# Patient Record
Sex: FEMALE | Race: WHITE | NOT HISPANIC OR LATINO | URBAN - METROPOLITAN AREA
[De-identification: names, ages, dates, MRNs, and addresses within clinical notes are randomized per-mention and may not be internally consistent; named-entity substitution may affect disease eponyms.]

---

## 2017-09-14 ENCOUNTER — HOSPITAL ENCOUNTER (OUTPATIENT)
Facility: MEDICAL CENTER | Age: 32
End: 2017-09-14
Attending: OBSTETRICS & GYNECOLOGY | Admitting: OBSTETRICS & GYNECOLOGY

## 2017-09-14 VITALS — DIASTOLIC BLOOD PRESSURE: 79 MMHG | HEART RATE: 105 BPM | SYSTOLIC BLOOD PRESSURE: 130 MMHG

## 2017-09-14 LAB
ABO GROUP BLD: NORMAL
AMPHET UR QL SCN: NEGATIVE
BARBITURATES UR QL SCN: NEGATIVE
BASOPHILS # BLD AUTO: 0.5 % (ref 0–1.8)
BASOPHILS # BLD: 0.06 K/UL (ref 0–0.12)
BENZODIAZ UR QL SCN: NEGATIVE
BLD GP AB SCN SERPL QL: NORMAL
BZE UR QL SCN: NEGATIVE
CANNABINOIDS UR QL SCN: NEGATIVE
EOSINOPHIL # BLD AUTO: 0.1 K/UL (ref 0–0.51)
EOSINOPHIL NFR BLD: 0.8 % (ref 0–6.9)
ERYTHROCYTE [DISTWIDTH] IN BLOOD BY AUTOMATED COUNT: 40 FL (ref 35.9–50)
HBV SURFACE AG SER QL: NEGATIVE
HCT VFR BLD AUTO: 36 % (ref 37–47)
HCV AB SER QL: NEGATIVE
HGB BLD-MCNC: 12.6 G/DL (ref 12–16)
HIV 1+2 AB+HIV1 P24 AG SERPL QL IA: NON REACTIVE
IMM GRANULOCYTES # BLD AUTO: 0.14 K/UL (ref 0–0.11)
IMM GRANULOCYTES NFR BLD AUTO: 1.1 % (ref 0–0.9)
LYMPHOCYTES # BLD AUTO: 2.21 K/UL (ref 1–4.8)
LYMPHOCYTES NFR BLD: 16.8 % (ref 22–41)
MCH RBC QN AUTO: 31.7 PG (ref 27–33)
MCHC RBC AUTO-ENTMCNC: 35 G/DL (ref 33.6–35)
MCV RBC AUTO: 90.7 FL (ref 81.4–97.8)
METHADONE UR QL SCN: NEGATIVE
MONOCYTES # BLD AUTO: 0.81 K/UL (ref 0–0.85)
MONOCYTES NFR BLD AUTO: 6.2 % (ref 0–13.4)
NEUTROPHILS # BLD AUTO: 9.81 K/UL (ref 2–7.15)
NEUTROPHILS NFR BLD: 74.6 % (ref 44–72)
NRBC # BLD AUTO: 0 K/UL
NRBC BLD AUTO-RTO: 0 /100 WBC
OPIATES UR QL SCN: NEGATIVE
OXYCODONE UR QL SCN: NEGATIVE
PCP UR QL SCN: NEGATIVE
PLATELET # BLD AUTO: 172 K/UL (ref 164–446)
PMV BLD AUTO: 11.3 FL (ref 9–12.9)
PROPOXYPH UR QL SCN: NEGATIVE
RBC # BLD AUTO: 3.97 M/UL (ref 4.2–5.4)
RH BLD: NORMAL
RUBV AB SER QL: 95.9 IU/ML
TREPONEMA PALLIDUM IGG+IGM AB [PRESENCE] IN SERUM OR PLASMA BY IMMUNOASSAY: NON REACTIVE
WBC # BLD AUTO: 13.1 K/UL (ref 4.8–10.8)

## 2017-09-14 PROCEDURE — 87340 HEPATITIS B SURFACE AG IA: CPT

## 2017-09-14 PROCEDURE — 86803 HEPATITIS C AB TEST: CPT

## 2017-09-14 PROCEDURE — 85025 COMPLETE CBC W/AUTO DIFF WBC: CPT

## 2017-09-14 PROCEDURE — 80307 DRUG TEST PRSMV CHEM ANLYZR: CPT

## 2017-09-14 PROCEDURE — 59025 FETAL NON-STRESS TEST: CPT | Performed by: OBSTETRICS & GYNECOLOGY

## 2017-09-14 PROCEDURE — 87389 HIV-1 AG W/HIV-1&-2 AB AG IA: CPT

## 2017-09-14 PROCEDURE — 36415 COLL VENOUS BLD VENIPUNCTURE: CPT

## 2017-09-14 PROCEDURE — 86780 TREPONEMA PALLIDUM: CPT

## 2017-09-14 PROCEDURE — 86762 RUBELLA ANTIBODY: CPT

## 2017-09-14 PROCEDURE — 87653 STREP B DNA AMP PROBE: CPT

## 2017-09-14 NOTE — PROGRESS NOTES
Patient came in for ob check.she has had care with the pregnancy-some in [place] and some in [place].sheis due 9-22 which makes her 38.6weeks.she has had a backache and a little spotting.patient is currently homeless and is here with children and cousin.spec exam done by Dr Mcgill-cervix closed-no bleeding.prenatal labs and GBS done.1535 patient discharged in stable condition with family.

## 2017-09-14 NOTE — DISCHARGE PLANNING
:    Referral: Homeless family needing resources.    Intervention:  Received a call from RN regarding patient who is 38 weeks pregnant and traveling from [place] back to [place] and ran out of money for gas and lodging.  Mother is also having contractions.  Met with mother, her cousin, Yaya Kimbrough, and mother's 2 children: ages 9 and 2.  The cousin stated they went to [place] because they were told there would be jobs and housing and when they got there they couldn't find either.  They are on their way back to [place] where they have a place to live.      SW met with patient and cousin and provided a packet of resources for shelters, food, and general resource lists.  Family was given 4 meal vouchers and the cousin was going to take the kids to the cafeteria to get some food.  Called [facility] and was told they do not assist with gas unless they have an open case with the family.  Recommended they go to [employer] which may offer gas car assistance.  Also recommended they contact churches in the area for assistance.  KIERA called the [facility] and was told they have a 2 month long waiting list.  Discussed with family and RN.    Plan:  Resources provided to family that could offer assistance with gas cards.

## 2017-09-14 NOTE — PROGRESS NOTES
[facility] LABOR AND DELIVERY TRIAGE PROGRESS NOTE    PATIENT ID:  NAME:  Da Eid  MRN:               [id number]  YOB: 1985     32 y.o. female  who has had prenatal care in [place] and [place], and per patient, has a KISHA of 17 by a 1st trimester ultrasound making EGA 38w6d. She just recently arrived with her 2 children and cousin to [place] and they are homeless. She would like to stay in [place] however her cousin is finding it difficult to work. Per patient, she received most of her prenatal care at [facility] with Dr. Troncoso in [place], [place].    Subjective: Pt presents with constant backache and light vaginal spotting with mucous discharge.   Pregnancy complicated by homelessness.    negative  For CTXS.   positive Feels pain   negative for LOF  negative for vaginal bleeding.   positive for fetal movement    ROS: Patient denies any fever chills, nausea, vomiting, headache, chest pain, shortness of breath, or dysuria or unusual swelling of hands or feet.     Objective:    Vitals:    17 1319   BP: 130/79   Pulse: (!) 105     No data recorded.    General: No acute distress, resting comfortably in bed.  HEENT: normocephalic, nontraumatic, PERRLA, EOMI  Cardiovascular: Heart RRR with no murmurs, rubs or gallops. Distal Pulses 2+  Respiratory: symmetric chest expansion, lungs CTAB  Abdomen: gravid, nontender  Extremities: no edema    Cervix:  closed on sterile spec exam  [place]: Occasional uterine contractions   FHRM: Baseline 125, some accels, no decels, moderate variability    Assessment: 32 y.o.  female who is not in labor. Records are being obtained from clinic in [place]. Prenatal labs ordered, Utox, and CBC. Sterile spec exam showed no blood or pooling of fluid and a closed cervix. Social work was called for support services after discharge.     Plan:   1. Patient is cleared to return home with family. Encouraged to see MD for increased painful uterine contractions @ 3-5, vaginal  bleeding, loss of fluid, or other serious symptoms.  2. At discharge give community resources for homelessness    Discussed case with Dr. Ponce, C Attending.    Makenna Mcgill M.D.

## 2017-09-15 LAB — GP B STREP DNA SPEC QL NAA+PROBE: NEGATIVE

## 2017-09-19 ENCOUNTER — HOSPITAL ENCOUNTER (INPATIENT)
Facility: MEDICAL CENTER | Age: 32
LOS: 2 days | End: 2017-09-21
Attending: OBSTETRICS & GYNECOLOGY | Admitting: OBSTETRICS & GYNECOLOGY
Payer: COMMERCIAL

## 2017-09-19 LAB
ALBUMIN SERPL BCP-MCNC: 2.8 G/DL (ref 3.2–4.9)
ALBUMIN/GLOB SERPL: 0.8 G/DL
ALP SERPL-CCNC: 220 U/L (ref 30–99)
ALT SERPL-CCNC: 6 U/L (ref 2–50)
AMPHET UR QL SCN: NEGATIVE
ANION GAP SERPL CALC-SCNC: 10 MMOL/L (ref 0–11.9)
AST SERPL-CCNC: 12 U/L (ref 12–45)
BARBITURATES UR QL SCN: NEGATIVE
BASOPHILS # BLD AUTO: 0.5 % (ref 0–1.8)
BASOPHILS # BLD: 0.06 K/UL (ref 0–0.12)
BENZODIAZ UR QL SCN: NEGATIVE
BILIRUB SERPL-MCNC: 0.4 MG/DL (ref 0.1–1.5)
BUN SERPL-MCNC: 5 MG/DL (ref 8–22)
BZE UR QL SCN: NEGATIVE
CALCIUM SERPL-MCNC: 8.5 MG/DL (ref 8.5–10.5)
CANNABINOIDS UR QL SCN: NEGATIVE
CHLORIDE SERPL-SCNC: 107 MMOL/L (ref 96–112)
CO2 SERPL-SCNC: 17 MMOL/L (ref 20–33)
CREAT SERPL-MCNC: 0.46 MG/DL (ref 0.5–1.4)
EOSINOPHIL # BLD AUTO: 0.15 K/UL (ref 0–0.51)
EOSINOPHIL NFR BLD: 1.1 % (ref 0–6.9)
ERYTHROCYTE [DISTWIDTH] IN BLOOD BY AUTOMATED COUNT: 40.8 FL (ref 35.9–50)
ERYTHROCYTE [DISTWIDTH] IN BLOOD BY AUTOMATED COUNT: 40.9 FL (ref 35.9–50)
ETHANOL BLD-MCNC: 0 G/DL
GFR SERPL CREATININE-BSD FRML MDRD: >60 ML/MIN/1.73 M 2
GLOBULIN SER CALC-MCNC: 3.3 G/DL (ref 1.9–3.5)
GLUCOSE SERPL-MCNC: 128 MG/DL (ref 65–99)
HCT VFR BLD AUTO: 33.4 % (ref 37–47)
HCT VFR BLD AUTO: 36.7 % (ref 37–47)
HGB BLD-MCNC: 11.5 G/DL (ref 12–16)
HGB BLD-MCNC: 12.7 G/DL (ref 12–16)
HOLDING TUBE BB 8507: NORMAL
IMM GRANULOCYTES # BLD AUTO: 0.13 K/UL (ref 0–0.11)
IMM GRANULOCYTES NFR BLD AUTO: 1 % (ref 0–0.9)
LYMPHOCYTES # BLD AUTO: 2.44 K/UL (ref 1–4.8)
LYMPHOCYTES NFR BLD: 18.4 % (ref 22–41)
MCH RBC QN AUTO: 31.5 PG (ref 27–33)
MCH RBC QN AUTO: 31.5 PG (ref 27–33)
MCHC RBC AUTO-ENTMCNC: 34.4 G/DL (ref 33.6–35)
MCHC RBC AUTO-ENTMCNC: 34.6 G/DL (ref 33.6–35)
MCV RBC AUTO: 91.1 FL (ref 81.4–97.8)
MCV RBC AUTO: 91.5 FL (ref 81.4–97.8)
METHADONE UR QL SCN: NEGATIVE
MONOCYTES # BLD AUTO: 1 K/UL (ref 0–0.85)
MONOCYTES NFR BLD AUTO: 7.5 % (ref 0–13.4)
NEUTROPHILS # BLD AUTO: 9.5 K/UL (ref 2–7.15)
NEUTROPHILS NFR BLD: 71.5 % (ref 44–72)
NRBC # BLD AUTO: 0 K/UL
NRBC BLD AUTO-RTO: 0 /100 WBC
OPIATES UR QL SCN: NEGATIVE
OXYCODONE UR QL SCN: POSITIVE
PCP UR QL SCN: NEGATIVE
PLATELET # BLD AUTO: 146 K/UL (ref 164–446)
PLATELET # BLD AUTO: 161 K/UL (ref 164–446)
PMV BLD AUTO: 11.1 FL (ref 9–12.9)
PMV BLD AUTO: 11.5 FL (ref 9–12.9)
POTASSIUM SERPL-SCNC: 3.7 MMOL/L (ref 3.6–5.5)
PROPOXYPH UR QL SCN: NEGATIVE
PROT SERPL-MCNC: 6.1 G/DL (ref 6–8.2)
RBC # BLD AUTO: 3.65 M/UL (ref 4.2–5.4)
RBC # BLD AUTO: 4.03 M/UL (ref 4.2–5.4)
SODIUM SERPL-SCNC: 134 MMOL/L (ref 135–145)
URATE SERPL-MCNC: 5.6 MG/DL (ref 1.9–8.2)
WBC # BLD AUTO: 13.3 K/UL (ref 4.8–10.8)
WBC # BLD AUTO: 16.2 K/UL (ref 4.8–10.8)

## 2017-09-19 PROCEDURE — 4A1HXCZ MONITORING OF PRODUCTS OF CONCEPTION, CARDIAC RATE, EXTERNAL APPROACH: ICD-10-PCS | Performed by: OBSTETRICS & GYNECOLOGY

## 2017-09-19 PROCEDURE — 36415 COLL VENOUS BLD VENIPUNCTURE: CPT

## 2017-09-19 PROCEDURE — 80053 COMPREHEN METABOLIC PANEL: CPT

## 2017-09-19 PROCEDURE — 85027 COMPLETE CBC AUTOMATED: CPT

## 2017-09-19 PROCEDURE — 304965 HCHG RECOVERY SERVICES

## 2017-09-19 PROCEDURE — 80307 DRUG TEST PRSMV CHEM ANLYZR: CPT

## 2017-09-19 PROCEDURE — 59409 OBSTETRICAL CARE: CPT

## 2017-09-19 PROCEDURE — 700111 HCHG RX REV CODE 636 W/ 250 OVERRIDE (IP): Performed by: OBSTETRICS & GYNECOLOGY

## 2017-09-19 PROCEDURE — 90471 IMMUNIZATION ADMIN: CPT

## 2017-09-19 PROCEDURE — 700111 HCHG RX REV CODE 636 W/ 250 OVERRIDE (IP)

## 2017-09-19 PROCEDURE — 3E0234Z INTRODUCTION OF SERUM, TOXOID AND VACCINE INTO MUSCLE, PERCUTANEOUS APPROACH: ICD-10-PCS | Performed by: OBSTETRICS & GYNECOLOGY

## 2017-09-19 PROCEDURE — A9270 NON-COVERED ITEM OR SERVICE: HCPCS | Performed by: NURSE PRACTITIONER

## 2017-09-19 PROCEDURE — 90732 PPSV23 VACC 2 YRS+ SUBQ/IM: CPT | Performed by: OBSTETRICS & GYNECOLOGY

## 2017-09-19 PROCEDURE — 84550 ASSAY OF BLOOD/URIC ACID: CPT

## 2017-09-19 PROCEDURE — 700105 HCHG RX REV CODE 258

## 2017-09-19 PROCEDURE — 85025 COMPLETE CBC W/AUTO DIFF WBC: CPT

## 2017-09-19 PROCEDURE — 700102 HCHG RX REV CODE 250 W/ 637 OVERRIDE(OP): Performed by: NURSE PRACTITIONER

## 2017-09-19 PROCEDURE — 303615 HCHG EPIDURAL/SPINAL ANESTHESIA FOR LABOR

## 2017-09-19 PROCEDURE — 770002 HCHG ROOM/CARE - OB PRIVATE (112)

## 2017-09-19 PROCEDURE — 700105 HCHG RX REV CODE 258: Performed by: NURSE PRACTITIONER

## 2017-09-19 RX ORDER — ONDANSETRON 2 MG/ML
4 INJECTION INTRAMUSCULAR; INTRAVENOUS EVERY 6 HOURS PRN
Status: DISCONTINUED | OUTPATIENT
Start: 2017-09-19 | End: 2017-09-21 | Stop reason: HOSPADM

## 2017-09-19 RX ORDER — OXYTOCIN 10 [USP'U]/ML
10 INJECTION, SOLUTION INTRAMUSCULAR; INTRAVENOUS
Status: DISCONTINUED | OUTPATIENT
Start: 2017-09-19 | End: 2017-09-19 | Stop reason: HOSPADM

## 2017-09-19 RX ORDER — SODIUM CHLORIDE, SODIUM LACTATE, POTASSIUM CHLORIDE, CALCIUM CHLORIDE 600; 310; 30; 20 MG/100ML; MG/100ML; MG/100ML; MG/100ML
INJECTION, SOLUTION INTRAVENOUS CONTINUOUS
Status: DISCONTINUED | OUTPATIENT
Start: 2017-09-19 | End: 2017-09-19 | Stop reason: HOSPADM

## 2017-09-19 RX ORDER — HYDROCODONE BITARTRATE AND ACETAMINOPHEN 5; 325 MG/1; MG/1
1 TABLET ORAL EVERY 4 HOURS PRN
Status: DISCONTINUED | OUTPATIENT
Start: 2017-09-19 | End: 2017-09-21 | Stop reason: HOSPADM

## 2017-09-19 RX ORDER — DEXTROSE, SODIUM CHLORIDE, SODIUM LACTATE, POTASSIUM CHLORIDE, AND CALCIUM CHLORIDE 5; .6; .31; .03; .02 G/100ML; G/100ML; G/100ML; G/100ML; G/100ML
INJECTION, SOLUTION INTRAVENOUS CONTINUOUS
Status: DISCONTINUED | OUTPATIENT
Start: 2017-09-19 | End: 2017-09-19 | Stop reason: HOSPADM

## 2017-09-19 RX ORDER — MISOPROSTOL 200 UG/1
600 TABLET ORAL
Status: DISCONTINUED | OUTPATIENT
Start: 2017-09-19 | End: 2017-09-21 | Stop reason: HOSPADM

## 2017-09-19 RX ORDER — ALUMINA, MAGNESIA, AND SIMETHICONE 2400; 2400; 240 MG/30ML; MG/30ML; MG/30ML
30 SUSPENSION ORAL EVERY 6 HOURS PRN
Status: DISCONTINUED | OUTPATIENT
Start: 2017-09-19 | End: 2017-09-19 | Stop reason: HOSPADM

## 2017-09-19 RX ORDER — ONDANSETRON 2 MG/ML
INJECTION INTRAMUSCULAR; INTRAVENOUS
Status: COMPLETED
Start: 2017-09-19 | End: 2017-09-19

## 2017-09-19 RX ORDER — HYDROCODONE BITARTRATE AND ACETAMINOPHEN 10; 325 MG/1; MG/1
1 TABLET ORAL EVERY 4 HOURS PRN
Status: DISCONTINUED | OUTPATIENT
Start: 2017-09-19 | End: 2017-09-21 | Stop reason: HOSPADM

## 2017-09-19 RX ORDER — TERBUTALINE SULFATE 1 MG/ML
0.25 INJECTION, SOLUTION SUBCUTANEOUS PRN
Status: DISCONTINUED | OUTPATIENT
Start: 2017-09-19 | End: 2017-09-19 | Stop reason: HOSPADM

## 2017-09-19 RX ORDER — BISACODYL 10 MG
10 SUPPOSITORY, RECTAL RECTAL PRN
Status: DISCONTINUED | OUTPATIENT
Start: 2017-09-19 | End: 2017-09-21 | Stop reason: HOSPADM

## 2017-09-19 RX ORDER — DOCUSATE SODIUM 100 MG/1
100 CAPSULE, LIQUID FILLED ORAL 2 TIMES DAILY PRN
Status: DISCONTINUED | OUTPATIENT
Start: 2017-09-19 | End: 2017-09-21 | Stop reason: HOSPADM

## 2017-09-19 RX ORDER — ACETAMINOPHEN 325 MG/1
325 TABLET ORAL EVERY 4 HOURS PRN
Status: DISCONTINUED | OUTPATIENT
Start: 2017-09-19 | End: 2017-09-21 | Stop reason: HOSPADM

## 2017-09-19 RX ORDER — CITRIC ACID/SODIUM CITRATE 334-500MG
30 SOLUTION, ORAL ORAL EVERY 6 HOURS PRN
Status: DISCONTINUED | OUTPATIENT
Start: 2017-09-19 | End: 2017-09-19 | Stop reason: HOSPADM

## 2017-09-19 RX ORDER — IBUPROFEN 600 MG/1
600 TABLET ORAL EVERY 6 HOURS PRN
Status: DISCONTINUED | OUTPATIENT
Start: 2017-09-19 | End: 2017-09-21 | Stop reason: HOSPADM

## 2017-09-19 RX ORDER — PENICILLIN G POTASSIUM 5000000 [IU]/1
INJECTION, POWDER, FOR SOLUTION INTRAMUSCULAR; INTRAVENOUS
Status: ACTIVE
Start: 2017-09-19 | End: 2017-09-19

## 2017-09-19 RX ORDER — VITAMIN A ACETATE, BETA CAROTENE, ASCORBIC ACID, CHOLECALCIFEROL, .ALPHA.-TOCOPHEROL ACETATE, DL-, THIAMINE MONONITRATE, RIBOFLAVIN, NIACINAMIDE, PYRIDOXINE HYDROCHLORIDE, FOLIC ACID, CYANOCOBALAMIN, CALCIUM CARBONATE, FERROUS FUMARATE, ZINC OXIDE, CUPRIC OXIDE 3080; 12; 120; 400; 1; 1.84; 3; 20; 22; 920; 25; 200; 27; 10; 2 [IU]/1; UG/1; MG/1; [IU]/1; MG/1; MG/1; MG/1; MG/1; MG/1; [IU]/1; MG/1; MG/1; MG/1; MG/1; MG/1
1 TABLET, FILM COATED ORAL EVERY MORNING
Status: DISCONTINUED | OUTPATIENT
Start: 2017-09-19 | End: 2017-09-21 | Stop reason: HOSPADM

## 2017-09-19 RX ORDER — CARBOPROST TROMETHAMINE 250 UG/ML
250 INJECTION, SOLUTION INTRAMUSCULAR
Status: DISCONTINUED | OUTPATIENT
Start: 2017-09-19 | End: 2017-09-19 | Stop reason: HOSPADM

## 2017-09-19 RX ORDER — SODIUM CHLORIDE, SODIUM LACTATE, POTASSIUM CHLORIDE, CALCIUM CHLORIDE 600; 310; 30; 20 MG/100ML; MG/100ML; MG/100ML; MG/100ML
INJECTION, SOLUTION INTRAVENOUS PRN
Status: DISCONTINUED | OUTPATIENT
Start: 2017-09-19 | End: 2017-09-21 | Stop reason: HOSPADM

## 2017-09-19 RX ORDER — ONDANSETRON 4 MG/1
4 TABLET, ORALLY DISINTEGRATING ORAL EVERY 6 HOURS PRN
Status: DISCONTINUED | OUTPATIENT
Start: 2017-09-19 | End: 2017-09-21 | Stop reason: HOSPADM

## 2017-09-19 RX ORDER — SODIUM CHLORIDE, SODIUM LACTATE, POTASSIUM CHLORIDE, CALCIUM CHLORIDE 600; 310; 30; 20 MG/100ML; MG/100ML; MG/100ML; MG/100ML
INJECTION, SOLUTION INTRAVENOUS
Status: COMPLETED
Start: 2017-09-19 | End: 2017-09-19

## 2017-09-19 RX ORDER — METOCLOPRAMIDE HYDROCHLORIDE 5 MG/ML
10 INJECTION INTRAMUSCULAR; INTRAVENOUS EVERY 6 HOURS PRN
Status: DISCONTINUED | OUTPATIENT
Start: 2017-09-19 | End: 2017-09-21 | Stop reason: HOSPADM

## 2017-09-19 RX ORDER — ROPIVACAINE HYDROCHLORIDE 2 MG/ML
INJECTION, SOLUTION EPIDURAL; INFILTRATION; PERINEURAL
Status: COMPLETED
Start: 2017-09-19 | End: 2017-09-19

## 2017-09-19 RX ORDER — METHYLERGONOVINE MALEATE 0.2 MG/ML
0.2 INJECTION INTRAVENOUS
Status: DISCONTINUED | OUTPATIENT
Start: 2017-09-19 | End: 2017-09-21 | Stop reason: HOSPADM

## 2017-09-19 RX ORDER — MISOPROSTOL 200 UG/1
800 TABLET ORAL
Status: DISCONTINUED | OUTPATIENT
Start: 2017-09-19 | End: 2017-09-19 | Stop reason: HOSPADM

## 2017-09-19 RX ADMIN — SODIUM CHLORIDE, POTASSIUM CHLORIDE, SODIUM LACTATE AND CALCIUM CHLORIDE 1000 ML: 600; 310; 30; 20 INJECTION, SOLUTION INTRAVENOUS at 06:25

## 2017-09-19 RX ADMIN — HYDROCODONE BITARTRATE AND ACETAMINOPHEN 1 TABLET: 10; 325 TABLET ORAL at 17:59

## 2017-09-19 RX ADMIN — PNEUMOCOCCAL VACCINE POLYVALENT 25 MCG
25; 25; 25; 25; 25; 25; 25; 25; 25; 25; 25; 25; 25; 25; 25; 25; 25; 25; 25; 25; 25; 25; 25 INJECTION, SOLUTION INTRAMUSCULAR; SUBCUTANEOUS at 20:54

## 2017-09-19 RX ADMIN — NICOTINE 7 MG: 7 PATCH, EXTENDED RELEASE TRANSDERMAL at 20:15

## 2017-09-19 RX ADMIN — SODIUM CHLORIDE, POTASSIUM CHLORIDE, SODIUM LACTATE AND CALCIUM CHLORIDE: 600; 310; 30; 20 INJECTION, SOLUTION INTRAVENOUS at 06:00

## 2017-09-19 RX ADMIN — ROPIVACAINE HYDROCHLORIDE 100 ML: 2 INJECTION, SOLUTION EPIDURAL; INFILTRATION at 06:52

## 2017-09-19 RX ADMIN — OXYTOCIN 20 UNITS: 10 INJECTION, SOLUTION INTRAMUSCULAR; INTRAVENOUS at 09:49

## 2017-09-19 RX ADMIN — ONDANSETRON 4 MG: 2 INJECTION INTRAMUSCULAR; INTRAVENOUS at 06:24

## 2017-09-19 RX ADMIN — HYDROCODONE BITARTRATE AND ACETAMINOPHEN 1 TABLET: 10; 325 TABLET ORAL at 13:29

## 2017-09-19 RX ADMIN — OXYTOCIN 20 UNITS: 10 INJECTION, SOLUTION INTRAMUSCULAR; INTRAVENOUS at 09:09

## 2017-09-19 RX ADMIN — IBUPROFEN 600 MG: 600 TABLET, FILM COATED ORAL at 20:14

## 2017-09-19 RX ADMIN — IBUPROFEN 600 MG: 600 TABLET, FILM COATED ORAL at 13:29

## 2017-09-19 ASSESSMENT — LIFESTYLE VARIABLES
REASON UNABLE TO ASSESS: 0
DO YOU DRINK ALCOHOL: NO
EVER_SMOKED: YES
DO YOU DRINK ALCOHOL: NO
ALCOHOL_USE: NO
DO YOU DRINK ALCOHOL: NO

## 2017-09-19 ASSESSMENT — PAIN SCALES - GENERAL
PAINLEVEL_OUTOF10: 3
PAINLEVEL_OUTOF10: 8
PAINLEVEL_OUTOF10: 7
PAINLEVEL_OUTOF10: 5
PAINLEVEL_OUTOF10: 9

## 2017-09-19 ASSESSMENT — PATIENT HEALTH QUESTIONNAIRE - PHQ9
SUM OF ALL RESPONSES TO PHQ QUESTIONS 1-9: 0
1. LITTLE INTEREST OR PLEASURE IN DOING THINGS: NOT AT ALL
2. FEELING DOWN, DEPRESSED, IRRITABLE, OR HOPELESS: NOT AT ALL
SUM OF ALL RESPONSES TO PHQ9 QUESTIONS 1 AND 2: 0

## 2017-09-19 ASSESSMENT — COPD QUESTIONNAIRES
COPD SCREENING SCORE: 0
DURING THE PAST 4 WEEKS HOW MUCH DID YOU FEEL SHORT OF BREATH: NONE/LITTLE OF THE TIME
HAVE YOU SMOKED AT LEAST 100 CIGARETTES IN YOUR ENTIRE LIFE: NO/DON'T KNOW
DO YOU EVER COUGH UP ANY MUCUS OR PHLEGM?: NO/ONLY WITH OCCASIONAL COLDS OR INFECTIONS

## 2017-09-19 NOTE — PROGRESS NOTES
Patient arrived in room 314 from L&D.  Report received from Chantal Demarco RN.  Oriented patient on safety information, infant identification, and skylight.

## 2017-09-19 NOTE — DELIVERY NOTE
VAGINAL DELIVERY PRODEDURE NOTE    PATIENT ID:  NAME:  Da Eid  MRN:               [id number]  YOB: 1985    On 2017 at 0905, this 32 y.o., at 39w4d now , GBS negative female delivered via  under epidural anesthesia a viable male infant not yet weighed with APGAR scores of 8 and 9 at one and five minutes. There was no nuchal cord and infant was bulb suctioned at delivery. Cord was doubly clamped, cut and infant handed to RN in attendance. An intact placenta was delivered spontaneously at 0909 with 3 vessel cord. Upon vaginal exam, there was a small hemostatic abrasion on posterior fourchette. Estimated blood loss was 250cc. Patient and infant will be transferred to postpartum in stable condition.    Makenna Mcgill M.D.    Delivery attended by Dr. Becerra who was present for the entire delivery.

## 2017-09-19 NOTE — CARE PLAN
Problem: Altered physiologic condition related to immediate post-delivery state and potential for bleeding/hemorrhage  Goal: Patient physiologically stable as evidenced by normal lochia, palpable uterine involution and vital signs within normal limits  Outcome: PROGRESSING AS EXPECTED  Vital signs stable, fundus is firm and lochia is light.    Problem: Potential for postpartum infection related to presence of episiotomy/vaginal tear and/or uterine contamination  Goal: Patient will be absent from signs and symptoms of infection  Outcome: PROGRESSING AS EXPECTED  Patient is afebrile and free from s/s of infection.

## 2017-09-19 NOTE — CARE PLAN
Problem: Pain  Goal: Alleviation of Pain or a reduction in pain to the patient's comfort goal    Intervention: Pain Management - Epidural/Spinal  Patient comfortable with epidural, patient encouraged to call RN with any complains of pain.

## 2017-09-19 NOTE — DISCHARGE PLANNING
:    Referral: MARK is homeless, CPS is involved assisting with resources.    Intervention:  Reviewed medical record.  Patient is familiar to KIERA from being on L&D last week (9/14).  SW met with MARK, her two children, and cousin-Yaya Kimbrough and provided a packet of resources (shelters, food, and general resources).  The family is on their way back to [place] from [place] when they ran out of money for gas.  [facility] is involved and the CPS worker is Joni Monsalve ([phone]).  Joni is here now meeting with MARK.  CPS has been assisting the family with paying for them to stay at a motel here in [facility].      Notified Joni that MOB's tox screen is positive for oxycodone and infant's is negative.      Plan:  [facility].

## 2017-09-19 NOTE — CARE PLAN
Problem: Risk for Infection, Impaired Wound Healing  Goal: Remain free from signs and symptoms of infection    Intervention: Limit vaginal exams as necessary  Patient AROM at 0742 with clear fluis, SVE 8-9/100/0, patient educated on the importance of liming vaginal exams in order to prevent infection.

## 2017-09-19 NOTE — PROGRESS NOTES
0526 -  here with EDC of 17 = 39.4 weeks reporting painful UCs. Reports positive FM and bloody show, denies LOF. Taken to S217 accompanied by family, instructed to change and call out when ready.   0530 - POC discussed, questions answered. VS taken, monitors applied x2.   0540 - SVE /-2, BBOW, strongly desires an epidural as soon as possible.   0550 - report given to EPIFANIO Mccurdy CNM. Admission orders received.   0632 - Dr. Bueno at bedside for epidural placement, tolerated well.   0644 - negative test dose.   0700 - report to FÁTIMA Demarco RN.

## 2017-09-19 NOTE — PROGRESS NOTES
0655 Received bedside report from JESSICA Langley RN, poc/orders reviewed.  Anesthesiologist at bedside,epidural procedures completed, patient resting comfortably in bed, supportive cousin and two children at bed side. LR running at 125 ml/hr, external monitors x2. 0700 Andrade placed, patient tolerated well, SVE 6/90/-1, patient wedged to right side. Patient denies any needs/concerns at this time. Patient encouraged to call RN with any needs. 0845 SVE C/+1, Dr. Becerra notified. Patient actively pushing with uc's. 0905 Patient delivered viable male, apgars 8/9, 3 vessel. 0909 Placenta delivered spontaneously intact/normal, skid kamilah, not repaired. 1130 Patient up to BR, unable to void, right left still wobbly able to walk with assistance. 1140 Patient and FOB moved to  #314 by wheelchair, in stable condition. 1145 Bedside report given to Tamera BAILEY RN.

## 2017-09-20 PROCEDURE — A9270 NON-COVERED ITEM OR SERVICE: HCPCS | Performed by: STUDENT IN AN ORGANIZED HEALTH CARE EDUCATION/TRAINING PROGRAM

## 2017-09-20 PROCEDURE — 700102 HCHG RX REV CODE 250 W/ 637 OVERRIDE(OP): Performed by: STUDENT IN AN ORGANIZED HEALTH CARE EDUCATION/TRAINING PROGRAM

## 2017-09-20 PROCEDURE — A9270 NON-COVERED ITEM OR SERVICE: HCPCS | Performed by: NURSE PRACTITIONER

## 2017-09-20 PROCEDURE — 700102 HCHG RX REV CODE 250 W/ 637 OVERRIDE(OP): Performed by: NURSE PRACTITIONER

## 2017-09-20 PROCEDURE — 770002 HCHG ROOM/CARE - OB PRIVATE (112)

## 2017-09-20 PROCEDURE — 700112 HCHG RX REV CODE 229: Performed by: STUDENT IN AN ORGANIZED HEALTH CARE EDUCATION/TRAINING PROGRAM

## 2017-09-20 RX ADMIN — HYDROCODONE BITARTRATE AND ACETAMINOPHEN 1 TABLET: 5; 325 TABLET ORAL at 06:37

## 2017-09-20 RX ADMIN — NICOTINE 7 MG: 7 PATCH, EXTENDED RELEASE TRANSDERMAL at 17:22

## 2017-09-20 RX ADMIN — IBUPROFEN 600 MG: 600 TABLET, FILM COATED ORAL at 15:26

## 2017-09-20 RX ADMIN — HYDROCODONE BITARTRATE AND ACETAMINOPHEN 1 TABLET: 5; 325 TABLET ORAL at 02:57

## 2017-09-20 RX ADMIN — Medication 1 TABLET: at 09:00

## 2017-09-20 RX ADMIN — IBUPROFEN 600 MG: 600 TABLET, FILM COATED ORAL at 09:00

## 2017-09-20 RX ADMIN — DOCUSATE SODIUM 100 MG: 100 CAPSULE ORAL at 15:26

## 2017-09-20 RX ADMIN — HYDROCODONE BITARTRATE AND ACETAMINOPHEN 1 TABLET: 5; 325 TABLET ORAL at 11:22

## 2017-09-20 RX ADMIN — HYDROCODONE BITARTRATE AND ACETAMINOPHEN 1 TABLET: 5; 325 TABLET ORAL at 15:26

## 2017-09-20 RX ADMIN — IBUPROFEN 600 MG: 600 TABLET, FILM COATED ORAL at 02:58

## 2017-09-20 ASSESSMENT — PAIN SCALES - GENERAL
PAINLEVEL_OUTOF10: 7
PAINLEVEL_OUTOF10: 5
PAINLEVEL_OUTOF10: 3
PAINLEVEL_OUTOF10: 5
PAINLEVEL_OUTOF10: 0
PAINLEVEL_OUTOF10: 6
PAINLEVEL_OUTOF10: 4
PAINLEVEL_OUTOF10: 7
PAINLEVEL_OUTOF10: 2

## 2017-09-20 ASSESSMENT — PATIENT HEALTH QUESTIONNAIRE - PHQ9
SUM OF ALL RESPONSES TO PHQ QUESTIONS 1-9: 0
SUM OF ALL RESPONSES TO PHQ9 QUESTIONS 1 AND 2: 0
2. FEELING DOWN, DEPRESSED, IRRITABLE, OR HOPELESS: NOT AT ALL
1. LITTLE INTEREST OR PLEASURE IN DOING THINGS: NOT AT ALL

## 2017-09-20 NOTE — PROGRESS NOTES
Received report from Valerie GONZALEZ. Assessment complete. Pt states pain at acceptable level. Discussed pain management plan with pt. Pt will call for PRN pain meds. Pt educated on the dangers of sleeping with infant. Call light within reach. Pt encouraged to call with needs or concerns.

## 2017-09-20 NOTE — CARE PLAN
Problem: Altered physiologic condition related to immediate post-delivery state and potential for bleeding/hemorrhage  Goal: Patient physiologically stable as evidenced by normal lochia, palpable uterine involution and vital signs within normal limits  Outcome: PROGRESSING AS EXPECTED  Fundus is firm and one finger breadth below the umbilicus. Lochia is light. Vital signs are within normal limits.     Problem: Potential for postpartum infection related to presence of episiotomy/vaginal tear and/or uterine contamination  Goal: Patient will be absent from signs and symptoms of infection  Outcome: PROGRESSING AS EXPECTED  Patient remains afebrile.

## 2017-09-20 NOTE — PROGRESS NOTES
"Name:   Da Eid   Date/Time:  9/20/2017 6:56 AM  Gestational Age:  Unknown  Admit Date:   9/19/2017  Admitting Dx:   Pregnancy  Labor and delivery, indication for care    PP day 1     Subjective:   Abdominal pain yes   Ambulating   yes  Tolerating PO  yes  Flatus    yes  Bleeding   Yes, improving  Voiding   yes   Dizziness   no  Breast feeding  yes  Breast tenderness  no    Blood pressure 110/59, pulse (!) 57, temperature 36.7 °C (98 °F), resp. rate 18, height 1.676 m (5' 6\"), weight 86.2 kg (190 lb), SpO2 95 %.  Breast Exam: Tenderness .no, Engourgement .no, Mastitis .no  Abdomen soft, non-tender. BS normal. No masses,  Fundus Tenderness:no, Below umbilicus:Yes, firm  Perineum intact  Extremities no cyanosis, clubbing, no edema    Meds:   No current facility-administered medications on file prior to encounter.      No current outpatient prescriptions on file prior to encounter.       Assessment and Plan  normal postpartum course PPD#1  Taking adequate diet and fluids, No heavy bleeding or foul vaginal discharge , Voiding without difficulty    Continue routine postpartum care, encourage ambulation, pain control, anticipate D/C home PPD#2  Patient desires 48hr stay.    Makenna Mcgill M.D.  "

## 2017-09-20 NOTE — CARE PLAN
Problem: Altered physiologic condition related to immediate post-delivery state and potential for bleeding/hemorrhage  Goal: Patient physiologically stable as evidenced by normal lochia, palpable uterine involution and vital signs within normal limits  Outcome: PROGRESSING AS EXPECTED  Vital signs stable. Fundus firm, lochia light     Problem: Alteration in comfort related to episiotomy, vaginal repair and/or after birth pains  Goal: Patient verbalizes acceptable pain level  Outcome: PROGRESSING AS EXPECTED  Discussed 0-10 pain scale and available prn pain medications with pt. Pt states pain at acceptable level at this time

## 2017-09-20 NOTE — DISCHARGE PLANNING
:     Ongoing:  Spoke with Joni Gricel [facility] who stated their agency will continue assisting the family for another couple weeks until they are able to travel back to [place].  Joni stated they are paying for the family to stay in a hotel and will be getting a car seat and other supplies for infant.  KIERA met with MOB and provided her a list of children's resources, WIC information, a diaper bank referral, and a bag of infant supplies.       Plan:  Discharge tomorrow and CPS will continue to assist family with resources/supplies.

## 2017-09-21 VITALS
RESPIRATION RATE: 18 BRPM | WEIGHT: 190 LBS | DIASTOLIC BLOOD PRESSURE: 84 MMHG | OXYGEN SATURATION: 97 % | TEMPERATURE: 97.8 F | SYSTOLIC BLOOD PRESSURE: 126 MMHG | HEART RATE: 79 BPM | BODY MASS INDEX: 30.53 KG/M2 | HEIGHT: 66 IN

## 2017-09-21 PROCEDURE — 700112 HCHG RX REV CODE 229: Performed by: STUDENT IN AN ORGANIZED HEALTH CARE EDUCATION/TRAINING PROGRAM

## 2017-09-21 PROCEDURE — 700102 HCHG RX REV CODE 250 W/ 637 OVERRIDE(OP): Performed by: NURSE PRACTITIONER

## 2017-09-21 PROCEDURE — A9270 NON-COVERED ITEM OR SERVICE: HCPCS | Performed by: NURSE PRACTITIONER

## 2017-09-21 PROCEDURE — A9270 NON-COVERED ITEM OR SERVICE: HCPCS | Performed by: STUDENT IN AN ORGANIZED HEALTH CARE EDUCATION/TRAINING PROGRAM

## 2017-09-21 PROCEDURE — 700102 HCHG RX REV CODE 250 W/ 637 OVERRIDE(OP): Performed by: STUDENT IN AN ORGANIZED HEALTH CARE EDUCATION/TRAINING PROGRAM

## 2017-09-21 RX ORDER — IBUPROFEN 800 MG/1
800 TABLET ORAL EVERY 6 HOURS PRN
Qty: 30 TAB | Refills: 0 | Status: SHIPPED | OUTPATIENT
Start: 2017-09-21

## 2017-09-21 RX ORDER — LABETALOL 100 MG/1
100 TABLET, FILM COATED ORAL 2 TIMES DAILY
Qty: 60 TAB | Refills: 0 | Status: SHIPPED | OUTPATIENT
Start: 2017-09-21

## 2017-09-21 RX ORDER — FERROUS SULFATE 325(65) MG
325 TABLET ORAL DAILY
Qty: 30 TAB | Refills: 0 | Status: SHIPPED | OUTPATIENT
Start: 2017-09-21

## 2017-09-21 RX ORDER — PSEUDOEPHEDRINE HCL 30 MG
100 TABLET ORAL 2 TIMES DAILY PRN
Qty: 60 CAP | Refills: 0 | Status: SHIPPED | OUTPATIENT
Start: 2017-09-21

## 2017-09-21 RX ORDER — VITAMIN A ACETATE, BETA CAROTENE, ASCORBIC ACID, CHOLECALCIFEROL, .ALPHA.-TOCOPHEROL ACETATE, DL-, THIAMINE MONONITRATE, RIBOFLAVIN, NIACINAMIDE, PYRIDOXINE HYDROCHLORIDE, FOLIC ACID, CYANOCOBALAMIN, CALCIUM CARBONATE, FERROUS FUMARATE, ZINC OXIDE, CUPRIC OXIDE 3080; 12; 120; 400; 1; 1.84; 3; 20; 22; 920; 25; 200; 27; 10; 2 [IU]/1; UG/1; MG/1; [IU]/1; MG/1; MG/1; MG/1; MG/1; MG/1; [IU]/1; MG/1; MG/1; MG/1; MG/1; MG/1
1 TABLET, FILM COATED ORAL EVERY MORNING
Qty: 30 TAB | Refills: 0 | Status: SHIPPED | OUTPATIENT
Start: 2017-09-21

## 2017-09-21 RX ADMIN — HYDROCODONE BITARTRATE AND ACETAMINOPHEN 1 TABLET: 5; 325 TABLET ORAL at 13:24

## 2017-09-21 RX ADMIN — HYDROCODONE BITARTRATE AND ACETAMINOPHEN 1 TABLET: 5; 325 TABLET ORAL at 07:42

## 2017-09-21 RX ADMIN — IBUPROFEN 600 MG: 600 TABLET, FILM COATED ORAL at 03:03

## 2017-09-21 RX ADMIN — DOCUSATE SODIUM 100 MG: 100 CAPSULE ORAL at 07:44

## 2017-09-21 RX ADMIN — IBUPROFEN 600 MG: 600 TABLET, FILM COATED ORAL at 09:07

## 2017-09-21 RX ADMIN — Medication 1 TABLET: at 09:07

## 2017-09-21 ASSESSMENT — PAIN SCALES - GENERAL
PAINLEVEL_OUTOF10: 4
PAINLEVEL_OUTOF10: 5
PAINLEVEL_OUTOF10: 2
PAINLEVEL_OUTOF10: 7
PAINLEVEL_OUTOF10: 0
PAINLEVEL_OUTOF10: 3

## 2017-09-21 NOTE — DISCHARGE SUMMARY
Discharge Summary:      Da Eid 31yo now  who presented at 39w4d     Admit Date:   2017  Discharge Date:  2017     Admitting diagnosis:  Pregnancy  Labor and delivery, indication for care  Discharge Diagnosis: Status post vaginal, spontaneous.  Pregnancy Complications: prenatal care in [place]  Tubal Ligation:  no        History:  No past medical history on file.  OB History    Para Term  AB Living   3 2           SAB TAB Ectopic Molar Multiple Live Births                    # Outcome Date GA Lbr Raphael/2nd Weight Sex Delivery Anes PTL Lv   3             2 Para            1 Para                    Review of patient's allergies indicates no known allergies.  There are no active problems to display for this patient.       Hospital Course:   32 y.o. , now para 3, was admitted with the above mentioned diagnosis, underwent Active Labor, vaginal, spontaneous. Patient postpartum course was unremarkable, with progressive advancement in diet , ambulation and toleration of oral analgesia. Patient without complaints today and desires discharge PPD#2.      Vitals:    17 1559 17 0722 17   BP: 135/88 110/59 124/88 129/88   Pulse: 67 (!) 57 85 84   Resp:  18 18 12   Temp: 36.4 °C (97.6 °F) 36.7 °C (98 °F) 36.8 °C (98.2 °F) 36.8 °C (98.2 °F)   TempSrc:       SpO2:  95% 97% 95%   Weight:       Height:           Current Facility-Administered Medications   Medication Dose   • ondansetron (ZOFRAN ODT) dispertab 4 mg  4 mg    Or   • ondansetron (ZOFRAN) syringe/vial injection 4 mg  4 mg   • metoclopramide (REGLAN) injection 10 mg  10 mg   • oxytocin (PITOCIN) infusion (for postpartum)   mL/hr   • ibuprofen (MOTRIN) tablet 600 mg  600 mg   • acetaminophen (TYLENOL) tablet 325 mg  325 mg   • hydrocodone-acetaminophen (NORCO) 5-325 MG per tablet 1 Tab  1 Tab   • hydrocodone/acetaminophen (NORCO)  MG per tablet 1 Tab  1 Tab   • LR  infusion     • PRN oxytocin (PITOCIN) (20 Units/1000 mL) PRN for excessive uterine bleeding - See Admin Instr  125-999 mL/hr   • misoprostol (CYTOTEC) tablet 600 mcg  600 mcg   • methylergonovine (METHERGINE) injection 0.2 mg  0.2 mg   • docusate sodium (COLACE) capsule 100 mg  100 mg   • prenatal plus vitamin (STUARTNATAL 1+1) 27-1 MG tablet 1 Tab  1 Tab   • bisacodyl (DULCOLAX) suppository 10 mg  10 mg   • magnesium hydroxide (MILK OF MAGNESIA) suspension 30 mL  30 mL   • nicotine (NICODERM) 7 MG/24HR 7 mg  7 mg       Exam:  CVS: regular rate and rhythm, no murmurs  Lungs: clear to auscultation   Breast Exam: negative  Abdomen: Abdomen soft, non-tender. BS normal. No masses,  No organomegaly  Fundus Non Tender: yes, firm and below umbilicus  Perineum: perineum intact  Extremity: extremities, peripheral pulses and reflexes normal, no edema, redness or tenderness in the calves or thighs     Labs:  Recent Labs      09/19/17   0555  09/19/17   1934   WBC  13.3*  16.2*   RBC  4.03*  3.65*   HEMOGLOBIN  12.7  11.5*   HEMATOCRIT  36.7*  33.4*   MCV  91.1  91.5   MCH  31.5  31.5   MCHC  34.6  34.4   RDW  40.8  40.9   PLATELETCT  161*  146*   MPV  11.1  11.5        Activity:   Discharge to home  Pelvic Rest x 6 weeks    Assessment:  Had elevated blood pressures antepartum and a couple postpartum but otherwise normal. Will give 1 month of labetalol 100mg PO BID as follow up uncertain. Patient and family trying to return to [place] but not sure when.   Discharge Assessment: Taking adequate diet and fluids, No heavy bleeding or foul vaginal discharge , Voiding without difficulty     Follow up:   [facility] in 1-2 weeks for blood pressure check   To resume daily PNV and iron supplement if needed with hydration.   Patient to RT TPC or ER if any of the following occur:  Fever over 100.5  Severe abdominal pain  Red streaks or painful masses in the breasts  Foul smelling discharge or lochia  Heavy vaginal bleeding saturating a pad per  hour  S/s of PP depression     Discharge Meds:   No current outpatient prescriptions on file.   Labetalol 100mg BID  Iron  Colace  Neri Mcgill M.D.

## 2017-09-21 NOTE — PROGRESS NOTES
Patient bonding well with baby. Baby is eating well and is taking enfamil. Baby has a rash to abdomen that mother states was very dry and cracked and she was given vaseline to put on it. It appears red with bumps at this time, I recommended she not put anything more on it until the pediatrician sees it tomorrow. She ambulates with steady gait. She is voiding without difficulty. Fundus is firm at -1 with light lochia. She does want to have the baby circumcised before discharge if possible.

## 2017-09-21 NOTE — PROGRESS NOTES
Verified and checked bands on parents and infant. Removed cord clamp and cuddles tag. Discharge instructions and paperwork given to patient. Checked car seat and verified infant is strapped into car seat properly. Parents left floor with infant in good condition. No RDS and assessments completed.

## 2017-09-21 NOTE — DISCHARGE INSTRUCTIONS
POSTPARTUM DISCHARGE INSTRUCTIONS FOR MOM    YOB: 1985   Age: 32 y.o.               Admit Date: 9/19/2017     Discharge Date: 9/21/2017  Attending Doctor:  Radha Wild M.D.                  Allergies:  Review of patient's allergies indicates no known allergies.    Discharged to home by car. Discharged via walking, hospital escort: Yes.  Special equipment needed: Not Applicable  Belongings with: Personal  Be sure to schedule a follow-up appointment with your primary care doctor or any specialists as instructed.     Discharge Plan:   Diet Plan: Discussed  Activity Level: Discussed  Smoking Cessation Offered: Patient Counseled  Confirmed Follow up Appointment: Patient to Call and Schedule Appointment  Confirmed Symptoms Management: Discussed  Medication Reconciliation Updated: Yes  Pneumococcal Vaccine Given - only chart on this line when given: Given (See MAR)  Influenza Vaccine Indication: Not indicated: Previously immunized this influenza season and > 8 years of age    REASONS TO CALL YOUR OBSTETRICIAN:  1.   Persistent fever or shaking chills (Temperature higher than 100.4)  2.   Heavy bleeding (soaking more than 1 pad per hour); Passing clots  3.   Foul odor from vagina  4.   Mastitis (Breast infection; breast pain, chills, fever, redness)  5.   Urinary pain, burning or frequency  6.   Episiotomy infection  7.   Abdominal incision infection  8.   Severe depression longer than 24 hours    HAND WASHING  · Prior to handling the baby.  · Before breastfeeding or bottle feeding baby.  · After using the bathroom or changing the baby's diaper.    WOUND CARE  Ask your physician for additional care instructions.  In general:    VAGINAL CARE  · Nothing inside vagina for 6 weeks: no sexual intercourse, tampons or douching.  · Bleeding may continue for 2-4 weeks.  Amount may vary.    · Call your physician for heavy bleeding which means soaking more than 1 pad per hour    BIRTH CONTROL  · It is possible to  "become pregnant at any time after delivery and while breastfeeding.  · Plan to discuss a method of birth control with your physician at your follow up visit. visit.    DIET AND ELIMINATION  · Eating more fiber (bran cereal, fruits, and vegetables) and drinking plenty of fluids will help to avoid constipation.  · Urinary frequency after childbirth is normal.    POSTPARTUM BLUES  During the first few days after birth, you may experience a sense of the \"blues\" which may include impatience, irritability or even crying.  These feeling come and go quickly.  However, as many as 1 in 10 women experience emotional symptoms known as postpartum depression.    Postpartum depression:  May start as early as the second or third day after delivery or take several weeks or months to develop.  Symptoms of \"blues\" are present, but are more intense:  Crying spells; loss of appetite; feelings of hopelessness or loss of control; fear of touching the baby; over concern or no concern at all about the baby; little or no concern about your own appearance/caring for yourself; and/or inability to sleep or excessive sleeping.  Contact your physician if you are experiencing any of these symptoms.    Crisis Hotline:  · [place] Crisis Hotline:  9-635-OHFAUBI  Or 1-670.816.4622  · [place] Crisis Hotline:  1-421.952.8991  Or 896-747-7834    PREVENTING SHAKEN BABY:  If you are angry or stressed, PUT THE BABY IN THE CRIB, step away, take some deep breaths, and wait until you are calm to care for the baby.  DO NOT SHAKE THE BABY.  You are not alone, call a supporter for help.    · Crisis Call Center 24/7 crisis line 548-887-7552 or 1-268.393.2442  · You can also text them, text \"ANSWER\" to 887313    QUIT SMOKING/TOBACCO USE:  I understand the use of any tobacco products increases my chance of suffering from future heart disease and could cause other illnesses which may shorten my life. Quitting the use of tobacco products is the single most important " thing I can do to improve my health. For further information on smoking / tobacco cessation call a Toll Free Quit Line at 1-872.964.8876 (*[employer] [place]) or 1-423.255.5721 (American Lung Association) or you can access the web based program at www.lungusa.org.    · [place] Tobacco Users Help Line:  (274) 820-3136       Toll Free: 1-729.825.2830  · Quit Tobacco Program [facility] [place] (970)595-9432    DEPRESSION / SUICIDE RISK:  As you are discharged from this [facility], it is important to learn how to keep safe from harming yourself.    Recognize the warning signs:  · Abrupt changes in personality, positive or negative- including increase in energy   · Giving away possessions  · Change in eating patterns- significant weight changes-  positive or negative  · Change in sleeping patterns- unable to sleep or sleeping all the time   · Unwillingness or inability to communicate  · Depression  · Unusual sadness, discouragement and loneliness  · Talk of wanting to die  · Neglect of personal appearance   · Rebelliousness- reckless behavior  · Withdrawal from people/activities they love  · Confusion- inability to concentrate     If you or a loved one observes any of these behaviors or has concerns about self-harm, here's what you can do:  · Talk about it- your feelings and reasons for harming yourself  · Remove any means that you might use to hurt yourself (examples: pills, rope, extension cords, firearm)  · Get professional help from the community (Mental Health, Substance Abuse, psychological counseling)  · Do not be alone:Call your Safe Contact- someone whom you trust who will be there for you.  · Call your local CRISIS HOTLINE 756-1634 or 967-396-3949  · Call your local Children's Mobile Crisis Response Team [place] (799) 436-0674 or www.[employer]  · Call the toll free National Suicide Prevention Hotlines   · National Suicide Prevention Lifeline 168-111-KYQC  (0366)  · [facility] 800-SUICIDE (927-1764)    DISCHARGE SURVEY:  Thank you for choosing [facility].  We hope we provided you with very good care.  You may be receiving a survey in the mail.  Please fill it out.  Your opinion is valuable to us.    ADDITIONAL EDUCATIONAL MATERIALS GIVEN TO PATIENT:  Pelvic rest for 6 weeks   Ambulate   Encourage breastfeeding   Sitz bath PRN   Follow up with TPC for blood pressure check prior to heading back to [place]      My signature on this form indicates that:  1.  I have reviewed and understand the above information  2.  My questions regarding this information have been answered to my satisfaction.  3.  I have formulated a plan with my discharge nurse to obtain my prescribed medication for home.

## 2018-06-21 NOTE — DISCHARGE PLANNING
:    Ongoing:  Provided MOB with 2 meal vouchers for her two other children to get some food in the cafeteria.  MOB will be discharged home today and [facility] will continue to assist family with resources.    Plan:  Nothing further.   21-Jun-2018

## 2023-12-08 NOTE — H&P
Plan: PSA in 6 months. PSA dropped nicely to 4.75 recently. Will see him back in 1 year. Can see him sooner if issues. He also describes some mild issues with ED. We discussed options and I gave him a handheld prescription for sildenafil to trial.  We discussed the use of the medication along with its risks and interactions. He is not on nitroglycerin medication     Labs reviewed at appt. History and Physical      Da Eid is a 32 y.o. year old female  at 39w4d who presents for RUCs.  She also reports a slight HA, denies dizziness, blurry vision or epigastric pain.  She was getting prenatal care in [place].  Has prenatal records with her.  Reports taking half-percocet which was prescribed to her by her doctor for pain.    Subjective:   positive  For CTXS.   positive Feels pain   negative for LOF  positive for vaginal bleeding - bloody show  positive for fetal movement    ROS: A comprehensive review of systems was negative.    No past medical history on file.  Past Surgical History:   Procedure Laterality Date   • CHOLECYSTECTOMY         • OTHER      Eustachian tubes. L sided 30% hearing loss   • OTHER ABDOMINAL SURGERY      Pancreas      OB History    Para Term  AB Living   3 2           SAB TAB Ectopic Molar Multiple Live Births                    # Outcome Date GA Lbr Raphael/2nd Weight Sex Delivery Anes PTL Lv   3             2 Para            1 Para                 Social History     Social History   • Marital status: Single     Spouse name: N/A   • Number of children: N/A   • Years of education: N/A     Occupational History   • Not on file.     Social History Main Topics   • Smoking status: Current Every Day Smoker     Packs/day: 0.50     Years: 15.00     Types: Cigarettes     Start date: 2002   • Smokeless tobacco: Never Used   • Alcohol use No   • Drug use: No   • Sexual activity: Not on file     Other Topics Concern   • Not on file     Social History Narrative   • No narrative on file     Allergies: Review of patient's allergies indicates not on file.    Current Facility-Administered Medications:   •  oxytocin (PITOCIN) 20 UNITS/1000ML LR, , , ,   •  LR infusion, , Intravenous, Continuous, Vicky Mccurdy, C.N.M., Last Rate: 125 mL/hr at 17 0625, 1,000 mL at 17 0625  •  D5LR infusion, , Intravenous, Continuous, Vicky Mccurdy,  "C.N.M.  •  fentaNYL (SUBLIMAZE) injection 50 mcg, 50 mcg, Intravenous, Q HOUR PRN, Vicky Mccurdy, C.N.M.  •  fentaNYL (SUBLIMAZE) injection 100 mcg, 100 mcg, Intravenous, Q HOUR PRN, Vicky Mccurdy, C.N.M.  •  terbutaline (BRETHINE) injection 0.25 mg, 0.25 mg, Intravenous, PRN, Vicky Mccurdy, C.N.M.  •  mag hydrox-al hydrox-simeth (MAALOX PLUS ES or MYLANTA DS) suspension 30 mL, 30 mL, Oral, Q6HRS PRN, Vicky Mccurdy, C.N.M.  •  Sod Citrate-Citric Acid (BICITRA) 500-334 MG/5ML solution 30 mL, 30 mL, Oral, Q6HRS PRN, Vicky Mccrudy, C.N.M.  •  oxytocin (PITOCIN) injection 10 Units, 10 Units, Intramuscular, Once PRN, Vicky Mccurdy, C.N.M.  •  misoprostol (CYTOTEC) tablet 800 mcg, 800 mcg, Rectal, Once PRN, Vicky Mccurdy, C.N.M.  •  carboPROST (HEMABATE) injection 250 mcg, 250 mcg, Intramuscular, Once PRN, Vicky Mccurdy, C.N.M.    Prenatal care in [place] - prenatal records available, see media.      Objective:      Pulse 83, temperature 36.1 °C (97 °F), temperature source Temporal, height 1.676 m (5' 6\"), weight 86.2 kg (190 lb), SpO2 98 %.    General:   no acute distress, alert, cooperative   Skin:   normal   HEENT:  PERRLA   Lungs:   CTA bilateral   Heart:   S1, S2 normal, no murmur, click, rub or gallop, regular rate and rhythm, brisk carotid upstroke without bruits, peripheral pulses very brisk, chest is clear without rales or wheezing, no pedal edema, no JVD, no hepatosplenomegaly   Abdomen:   gravid, NT   EFW:  3400 g   Pelvis:  Exam deferred., proven to 3000 g   FHTs: 120 BPM + accels - decels mod variability   Contractions: 3 contractions in 10 min firm to palpation   Uterine Size: S=D   Presentations: Cephalic per RN   Cervix: Per RN    Dilation: 6    Effacement: 90    Station:  -2    Consistency: Medium    Position: Middle     Complete OB US  See media    Lab Review  Lab:   Blood type: O     Recent Results (from the past 5880 hour(s))   URINE DRUG SCREEN    Collection " Time: 09/14/17  2:30 PM   Result Value Ref Range    Amphetamines Urine Negative Negative    Barbiturates Negative Negative    Benzodiazepines Negative Negative    Cocaine Metabolite Negative Negative    Methadone Negative Negative    Opiates Negative Negative    Oxycodone Negative Negative    Phencyclidine -Pcp Negative Negative    Propoxyphene Negative Negative    Cannabinoid Metab Negative Negative   PRENATAL PANEL 3+HIV+HCV    Collection Time: 09/14/17  2:35 PM   Result Value Ref Range    WBC 13.1 (H) 4.8 - 10.8 K/uL    RBC 3.97 (L) 4.20 - 5.40 M/uL    Hemoglobin 12.6 12.0 - 16.0 g/dL    Hematocrit 36.0 (L) 37.0 - 47.0 %    MCV 90.7 81.4 - 97.8 fL    MCH 31.7 27.0 - 33.0 pg    MCHC 35.0 33.6 - 35.0 g/dL    RDW 40.0 35.9 - 50.0 fL    Platelet Count 172 164 - 446 K/uL    MPV 11.3 9.0 - 12.9 fL    Neutrophils-Polys 74.60 (H) 44.00 - 72.00 %    Lymphocytes 16.80 (L) 22.00 - 41.00 %    Monocytes 6.20 0.00 - 13.40 %    Eosinophils 0.80 0.00 - 6.90 %    Basophils 0.50 0.00 - 1.80 %    Immature Granulocytes 1.10 (H) 0.00 - 0.90 %    Nucleated RBC 0.00 /100 WBC    Neutrophils (Absolute) 9.81 (H) 2.00 - 7.15 K/uL    Lymphs (Absolute) 2.21 1.00 - 4.80 K/uL    Monos (Absolute) 0.81 0.00 - 0.85 K/uL    Eos (Absolute) 0.10 0.00 - 0.51 K/uL    Baso (Absolute) 0.06 0.00 - 0.12 K/uL    Immature Granulocytes (abs) 0.14 (H) 0.00 - 0.11 K/uL    NRBC (Absolute) 0.00 K/uL    Rubella IgG Antibody 95.90 IU/mL    Syphilis, Treponemal Qual Non Reactive Non Reactive    Hepatitis B Surface Antigen Negative Negative    Hepatitis C Antibody Negative Negative   HIV ANTIBODIES    Collection Time: 09/14/17  2:35 PM   Result Value Ref Range    HIV Ag/Ab Combo Assay Non Reactive Non Reactive   IP OBSTETRIC PANEL-BB (ABORH/ANTIBODY SCREEN)    Collection Time: 09/14/17  2:35 PM   Result Value Ref Range    ABO Grouping Only O     Rh Grouping Only POS     Antibody Screen Scrn NEG    GRP B STREP, BY PCR (RICARDO BROTH)    Collection Time: 09/14/17  2:52 PM    Result Value Ref Range    Strep Gp B DNA PCR Negative Negative   CBC WITH DIFFERENTIAL    Collection Time: 09/19/17  5:55 AM   Result Value Ref Range    WBC 13.3 (H) 4.8 - 10.8 K/uL    RBC 4.03 (L) 4.20 - 5.40 M/uL    Hemoglobin 12.7 12.0 - 16.0 g/dL    Hematocrit 36.7 (L) 37.0 - 47.0 %    MCV 91.1 81.4 - 97.8 fL    MCH 31.5 27.0 - 33.0 pg    MCHC 34.6 33.6 - 35.0 g/dL    RDW 40.8 35.9 - 50.0 fL    Platelet Count 161 (L) 164 - 446 K/uL    MPV 11.1 9.0 - 12.9 fL    Neutrophils-Polys 71.50 44.00 - 72.00 %    Lymphocytes 18.40 (L) 22.00 - 41.00 %    Monocytes 7.50 0.00 - 13.40 %    Eosinophils 1.10 0.00 - 6.90 %    Basophils 0.50 0.00 - 1.80 %    Immature Granulocytes 1.00 (H) 0.00 - 0.90 %    Nucleated RBC 0.00 /100 WBC    Neutrophils (Absolute) 9.50 (H) 2.00 - 7.15 K/uL    Lymphs (Absolute) 2.44 1.00 - 4.80 K/uL    Monos (Absolute) 1.00 (H) 0.00 - 0.85 K/uL    Eos (Absolute) 0.15 0.00 - 0.51 K/uL    Baso (Absolute) 0.06 0.00 - 0.12 K/uL    Immature Granulocytes (abs) 0.13 (H) 0.00 - 0.11 K/uL    NRBC (Absolute) 0.00 K/uL   HOLD BLOOD BANK SPECIMEN (NOT TESTED)    Collection Time: 09/19/17  5:55 AM   Result Value Ref Range    Holding Tube - Bb DONE    Diagnostic Alcohol    Collection Time: 09/19/17  5:55 AM   Result Value Ref Range    Diagnostic Alcohol 0.00 0.00 g/dL   COMP METABOLIC PANEL    Collection Time: 09/19/17  5:55 AM   Result Value Ref Range    Sodium 134 (L) 135 - 145 mmol/L    Potassium 3.7 3.6 - 5.5 mmol/L    Chloride 107 96 - 112 mmol/L    Co2 17 (L) 20 - 33 mmol/L    Anion Gap 10.0 0.0 - 11.9    Glucose 128 (H) 65 - 99 mg/dL    Bun 5 (L) 8 - 22 mg/dL    Creatinine 0.46 (L) 0.50 - 1.40 mg/dL    AST(SGOT) 12 12 - 45 U/L    ALT(SGPT) 6 2 - 50 U/L    Alkaline Phosphatase 220 (H) 30 - 99 U/L    Total Bilirubin 0.4 0.1 - 1.5 mg/dL    Albumin 2.8 (L) 3.2 - 4.9 g/dL    Total Protein 6.1 6.0 - 8.2 g/dL    Globulin 3.3 1.9 - 3.5 g/dL    A-G Ratio 0.8 g/dL    Calcium 8.5 8.5 - 10.5 mg/dL   URIC ACID     Collection Time: 09/19/17  5:55 AM   Result Value Ref Range    Uric Acid 5.6 1.9 - 8.2 mg/dL   ESTIMATED GFR    Collection Time: 09/19/17  5:55 AM   Result Value Ref Range    GFR If African American >60 >60 mL/min/1.73 m 2    GFR If Non African American >60 >60 mL/min/1.73 m 2   Urine drug screen    Collection Time: 09/19/17  6:05 AM   Result Value Ref Range    Amphetamines Urine Negative Negative    Barbiturates Negative Negative    Benzodiazepines Negative Negative    Cocaine Metabolite Negative Negative    Methadone Negative Negative    Opiates Negative Negative    Oxycodone Positive (A) Negative    Phencyclidine -Pcp Negative Negative    Propoxyphene Negative Negative    Cannabinoid Metab Negative Negative        Assessment:   1.  IUP at Unknown  2.  Labor status: Active phase labor.  3.  Cat 1 FHTs  4.  Elevated BPs.     Plan:     Admit to L&D  GBS negative  Routine labs, including PIH labs  Pain management prn - pt desires epidural.